# Patient Record
Sex: MALE | Employment: STUDENT | URBAN - METROPOLITAN AREA
[De-identification: names, ages, dates, MRNs, and addresses within clinical notes are randomized per-mention and may not be internally consistent; named-entity substitution may affect disease eponyms.]

---
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
## 2025-04-05 ENCOUNTER — OFFICE VISIT (OUTPATIENT)
Dept: URGENT CARE | Age: 25
End: 2025-04-05
Payer: COMMERCIAL

## 2025-04-05 VITALS
DIASTOLIC BLOOD PRESSURE: 59 MMHG | SYSTOLIC BLOOD PRESSURE: 109 MMHG | TEMPERATURE: 97.8 F | HEART RATE: 64 BPM | OXYGEN SATURATION: 96 % | RESPIRATION RATE: 16 BRPM | WEIGHT: 180 LBS | HEIGHT: 72 IN | BODY MASS INDEX: 24.38 KG/M2

## 2025-04-05 DIAGNOSIS — K59.00 CONSTIPATION, UNSPECIFIED CONSTIPATION TYPE: Primary | ICD-10-CM

## 2025-04-05 DIAGNOSIS — R10.12 LEFT UPPER QUADRANT ABDOMINAL PAIN: ICD-10-CM

## 2025-04-05 ASSESSMENT — PAIN SCALES - GENERAL: PAINLEVEL_OUTOF10: 0-NO PAIN

## 2025-04-05 NOTE — PROGRESS NOTES
Subjective   Patient ID: Linus Cagle is a 24 y.o. male. They present today with a chief complaint of Bloated (8 days/Fatigue, weakness, loss of appetite, abdominal bloating and pressure, constipation).    History of Present Illness  HPI:  Patient Identification  Linus Cagle is a 24 y.o. male.    Chief Complaint   Bloated (8 days/Fatigue, weakness, loss of appetite, abdominal bloating and pressure, constipation)      Patient presents for evaluation of abdominal pain, bloating, loss of appetite, fatigue. Onset of symptoms was gradual starting 8 days ago with unchanged course since that time. The pain is located LUQ, epigastric without radiation. The pain is rated as intermittent. The pain is made worse by position and is relieved by nothing. The patient also complains of anorexia, constipation, and fatigue. The patient denies chills, diarrhea, fever, hematochezia, hematuria, melena, nausea, and vomiting. The past workup has included surgery (appendectomy 2021). The pertinent past history includes appendicitis. The patient denies GERD, Crohn's disease, ulcerative colitis, irritable bowel. Home care consisted of Miralax with intermittent relief. Denies SOB, CP, or dizziness. Denies any urinary symptoms.     History reviewed. No pertinent past medical history.  No family history on file.  ([address])  No current outpatient medications on file.  No current facility-administered medications for this visit.     -- Doxycycline -- GI intolerance and Other   -- Penicillins -- Rash and Swelling  Social History    Socioeconomic History      Marital status: Single      Spouse name: Not on file      Number of children: Not on file      Years of education: Not on file      Highest education level: Not on file    Occupational History      Not on file    Tobacco Use      Smoking status: Never      Smokeless tobacco: Never    Vaping Use      Vaping status: Never Used    Substance and Sexual Activity      Alcohol use: Not on file       Drug use: Not on file      Sexual activity: Not on file    Other Topics      Concerns:        Not on file    Social History Narrative      Not on file    Social Drivers of Health  Financial Resource Strain: Low Risk  (6/1/2023)      Received from [facility]      Overall Financial Resource Strain (CARDIA)          Difficulty of Paying Living Expenses: Not hard at all  Food Insecurity: No Food Insecurity (6/1/2023)      Received from [facility]      Hunger Vital Sign          Worried About Running Out of Food in the Last Year: Never true          Ran Out of Food in the Last Year: Never true  Transportation Needs: No Transportation Needs (6/1/2023)      Received from [facility]      PRAPARE - Transportation          Lack of Transportation (Medical): No          Lack of Transportation (Non-Medical): No  Physical Activity: Sufficiently Active (6/1/2023)      Received from [facility]      Exercise Vital Sign          Days of Exercise per Week: 7 days          Minutes of Exercise per Session: 60 min  Stress: No Stress Concern Present (6/1/2023)      Received from [facility]      [place] [employer] - Occupational Stress Questionnaire          Feeling of Stress : Not at all  Social Connections: Socially Isolated (6/1/2023)      Received from [facility]      Social Connection and Isolation Panel [NHANES]          Frequency of Communication with Friends and Family: More than three times a week          Frequency of Social Gatherings with Friends and Family: More than three times a week          Attends Alevism Services: Never          Active Member of Clubs or Organizations: No          Attends Club or Organization Meetings: Never          Marital Status: Never   Intimate Partner Violence: Not At Risk (6/1/2023)      Received from [facility]      Humiliation, Afraid, Rape, and Kick questionnaire          Fear of Current or Ex-Partner: No          Emotionally  Abused: No          Physically Abused: No          Sexually Abused: No  Housing Stability: Low Risk  (6/1/2023)      Received from [facility]      Housing Stability Vital Sign          Unable to Pay for Housing in the Last Year: No          Number of Places Lived in the Last Year: 1          Unstable Housing in the Last Year: No  Review of Systems  Pertinent items are noted in HPI.     Physical Exam  /59 (BP Location: Right arm, Patient Position: Sitting, BP Cuff Size: Adult)   Pulse 64   Temp 36.6 °C (97.8 °F) (Oral)   Resp 16   Ht 1.829 m (6')   Wt 81.6 kg (180 lb)   SpO2 96%   BMI 24.41 kg/m²   General appearance: alert and oriented, in no acute distress  Lungs: clear to auscultation bilaterally  Chest wall: no tenderness  Heart: regular rate and rhythm, S1, S2 normal, no murmur, click, rub or gallop  Abdomen: normal findings: liver span normal to percussion, no masses palpable, no organomegaly, no scars, striae, dilated veins, rashes, or lesions, spleen non-palpable, symmetric, and umbilicus normal and abnormal findings:  hypoactive bowel sounds and mid-epigastric pain, as well as LUQ tenderness  Skin: Pale, pale conjunctiva.        History provided by:  Patient and medical records    Past Medical History  Allergies as of 04/05/2025 - Reviewed 04/05/2025   Allergen Reaction Noted    Doxycycline GI intolerance and Other 03/31/2017    Penicillins Rash and Swelling 04/02/2014      reports that he has never smoked. He has never used smokeless tobacco.        After reviewing all body systems I have documented pertinent findings above in the history.  All other Systems reviewed and are negative for complaint.  Pertinent positive and negatives are listed in the above HPI.        Objective    Vitals:    04/05/25 0823   BP: 109/59   BP Location: Right arm   Patient Position: Sitting   BP Cuff Size: Adult   Pulse: 64   Resp: 16   Temp: 36.6 °C (97.8 °F)   TempSrc: Oral   SpO2: 96%   Weight: 81.6 kg (180  lb)   Height: 1.829 m (6')     No LMP for male patient.    Point of Care Test & Imaging Results from this visit  Results for orders placed or performed in visit on 09/06/24   Urine Culture    Collection Time: 09/06/24  5:03 PM    Specimen: Clean Catch/Voided; Urine   Result Value Ref Range    Urine Culture No growth      Imaging  No results found.    Cardiology, Vascular, and Other Imaging  No other imaging results found for the past 2 days      Diagnostic study results (if any) were reviewed by REZNO French.    Assessment/Plan   Allergies, medications, history, and pertinent labs/EKGs/Imaging reviewed by RENZO French.       MDM:  Patient presenting for abdominal pain    Due to limited resources and acuity of patient care I advised ED evaluation.  Differential diagnosis includes, but is not limited to GI pathology. Patient/family was counseled regarding the need for further ED evaluation and is in agreement with the above plan. The patient will be traveling by private vehicle with parents.  A discussion was made regarding the risks and benefits of doing such.  Patient/family understands these risks and has made the decision to pursue transportation to the ED by the above method.     At time of discharge patient's vital signs are stable.  Patient referred to the emergency department  Chart sent to urgent care supervising physician    It was discussed that failure to have further evaluation and treatment today may lead, but is not limited to negative outcomes, permanent disability, or even death.  Patient has good decision making capacity.  They are alert to person, place, time and situation.  Patient has the ability to communicate choice, understand information, consequences, and reason rationally.        Based on your symptoms and physical exam findings I believe you need further evaluation and care in the emergency department.  I am concerned we do not have everything you need in our  urgent care to fully assess what is going on so I recommend we send you to the emergency department now for further evaluation.      PLAN:    Due to limitation of resources and testing capabilities to fully assess your needs I recommend you be seen in the ED now for further evaluation.    -Please go to: nearest ER    When you get there, the ED provider will assess you just like I did, and will decide further testing based on what they see an how your condition progresses.        Orders and Diagnoses  Diagnoses and all orders for this visit:  Constipation, unspecified constipation type  Left upper quadrant abdominal pain      Medical Admin Record      Follow Up Instructions  No follow-ups on file.    Patient disposition: ED    Electronically signed by RENZO French  9:07 AM